# Patient Record
Sex: FEMALE | Race: OTHER | NOT HISPANIC OR LATINO | URBAN - METROPOLITAN AREA
[De-identification: names, ages, dates, MRNs, and addresses within clinical notes are randomized per-mention and may not be internally consistent; named-entity substitution may affect disease eponyms.]

---

## 2022-08-03 ENCOUNTER — EMERGENCY (EMERGENCY)
Facility: HOSPITAL | Age: 22
LOS: 1 days | Discharge: HOME | End: 2022-08-03
Attending: EMERGENCY MEDICINE | Admitting: EMERGENCY MEDICINE
Payer: MEDICAID

## 2022-08-03 VITALS
TEMPERATURE: 99 F | RESPIRATION RATE: 16 BRPM | DIASTOLIC BLOOD PRESSURE: 68 MMHG | SYSTOLIC BLOOD PRESSURE: 104 MMHG | HEART RATE: 78 BPM | OXYGEN SATURATION: 98 %

## 2022-08-03 VITALS
HEART RATE: 67 BPM | OXYGEN SATURATION: 99 % | TEMPERATURE: 98 F | WEIGHT: 130.07 LBS | RESPIRATION RATE: 15 BRPM | HEIGHT: 61 IN | SYSTOLIC BLOOD PRESSURE: 115 MMHG | DIASTOLIC BLOOD PRESSURE: 77 MMHG

## 2022-08-03 DIAGNOSIS — R00.0 TACHYCARDIA, UNSPECIFIED: ICD-10-CM

## 2022-08-03 DIAGNOSIS — R07.81 PLEURODYNIA: ICD-10-CM

## 2022-08-03 DIAGNOSIS — Z20.822 CONTACT WITH AND (SUSPECTED) EXPOSURE TO COVID-19: ICD-10-CM

## 2022-08-03 DIAGNOSIS — R06.02 SHORTNESS OF BREATH: ICD-10-CM

## 2022-08-03 DIAGNOSIS — R07.9 CHEST PAIN, UNSPECIFIED: ICD-10-CM

## 2022-08-03 LAB
ALBUMIN SERPL ELPH-MCNC: 5.2 G/DL — HIGH (ref 3.3–5)
ALP SERPL-CCNC: 48 U/L — SIGNIFICANT CHANGE UP (ref 40–120)
ALT FLD-CCNC: 11 U/L — SIGNIFICANT CHANGE UP (ref 10–45)
ANION GAP SERPL CALC-SCNC: 12 MMOL/L — SIGNIFICANT CHANGE UP (ref 5–17)
AST SERPL-CCNC: 17 U/L — SIGNIFICANT CHANGE UP (ref 10–40)
BASOPHILS # BLD AUTO: 0.04 K/UL — SIGNIFICANT CHANGE UP (ref 0–0.2)
BASOPHILS NFR BLD AUTO: 0.4 % — SIGNIFICANT CHANGE UP (ref 0–2)
BILIRUB SERPL-MCNC: 0.7 MG/DL — SIGNIFICANT CHANGE UP (ref 0.2–1.2)
BUN SERPL-MCNC: 13 MG/DL — SIGNIFICANT CHANGE UP (ref 7–23)
CALCIUM SERPL-MCNC: 9.9 MG/DL — SIGNIFICANT CHANGE UP (ref 8.4–10.5)
CHLORIDE SERPL-SCNC: 102 MMOL/L — SIGNIFICANT CHANGE UP (ref 96–108)
CO2 SERPL-SCNC: 23 MMOL/L — SIGNIFICANT CHANGE UP (ref 22–31)
CREAT SERPL-MCNC: 0.55 MG/DL — SIGNIFICANT CHANGE UP (ref 0.5–1.3)
D DIMER BLD IA.RAPID-MCNC: 229 NG/ML DDU — SIGNIFICANT CHANGE UP
EGFR: 134 ML/MIN/1.73M2 — SIGNIFICANT CHANGE UP
EOSINOPHIL # BLD AUTO: 0.2 K/UL — SIGNIFICANT CHANGE UP (ref 0–0.5)
EOSINOPHIL NFR BLD AUTO: 2 % — SIGNIFICANT CHANGE UP (ref 0–6)
GLUCOSE SERPL-MCNC: 94 MG/DL — SIGNIFICANT CHANGE UP (ref 70–99)
HCG SERPL-ACNC: <0 MIU/ML — SIGNIFICANT CHANGE UP
HCT VFR BLD CALC: 37.2 % — SIGNIFICANT CHANGE UP (ref 34.5–45)
HGB BLD-MCNC: 13 G/DL — SIGNIFICANT CHANGE UP (ref 11.5–15.5)
IMM GRANULOCYTES NFR BLD AUTO: 0.2 % — SIGNIFICANT CHANGE UP (ref 0–1.5)
LYMPHOCYTES # BLD AUTO: 3.06 K/UL — SIGNIFICANT CHANGE UP (ref 1–3.3)
LYMPHOCYTES # BLD AUTO: 31 % — SIGNIFICANT CHANGE UP (ref 13–44)
MCHC RBC-ENTMCNC: 31.3 PG — SIGNIFICANT CHANGE UP (ref 27–34)
MCHC RBC-ENTMCNC: 34.9 GM/DL — SIGNIFICANT CHANGE UP (ref 32–36)
MCV RBC AUTO: 89.6 FL — SIGNIFICANT CHANGE UP (ref 80–100)
MONOCYTES # BLD AUTO: 0.55 K/UL — SIGNIFICANT CHANGE UP (ref 0–0.9)
MONOCYTES NFR BLD AUTO: 5.6 % — SIGNIFICANT CHANGE UP (ref 2–14)
NEUTROPHILS # BLD AUTO: 6.01 K/UL — SIGNIFICANT CHANGE UP (ref 1.8–7.4)
NEUTROPHILS NFR BLD AUTO: 60.8 % — SIGNIFICANT CHANGE UP (ref 43–77)
NRBC # BLD: 0 /100 WBCS — SIGNIFICANT CHANGE UP (ref 0–0)
PLATELET # BLD AUTO: 271 K/UL — SIGNIFICANT CHANGE UP (ref 150–400)
POTASSIUM SERPL-MCNC: 3.8 MMOL/L — SIGNIFICANT CHANGE UP (ref 3.5–5.3)
POTASSIUM SERPL-SCNC: 3.8 MMOL/L — SIGNIFICANT CHANGE UP (ref 3.5–5.3)
PROT SERPL-MCNC: 7.9 G/DL — SIGNIFICANT CHANGE UP (ref 6–8.3)
RBC # BLD: 4.15 M/UL — SIGNIFICANT CHANGE UP (ref 3.8–5.2)
RBC # FLD: 12.1 % — SIGNIFICANT CHANGE UP (ref 10.3–14.5)
SARS-COV-2 RNA SPEC QL NAA+PROBE: NEGATIVE — SIGNIFICANT CHANGE UP
SODIUM SERPL-SCNC: 137 MMOL/L — SIGNIFICANT CHANGE UP (ref 135–145)
WBC # BLD: 9.88 K/UL — SIGNIFICANT CHANGE UP (ref 3.8–10.5)
WBC # FLD AUTO: 9.88 K/UL — SIGNIFICANT CHANGE UP (ref 3.8–10.5)

## 2022-08-03 PROCEDURE — 93010 ELECTROCARDIOGRAM REPORT: CPT

## 2022-08-03 PROCEDURE — 87635 SARS-COV-2 COVID-19 AMP PRB: CPT

## 2022-08-03 PROCEDURE — 84702 CHORIONIC GONADOTROPIN TEST: CPT

## 2022-08-03 PROCEDURE — 99285 EMERGENCY DEPT VISIT HI MDM: CPT | Mod: 25

## 2022-08-03 PROCEDURE — 71046 X-RAY EXAM CHEST 2 VIEWS: CPT

## 2022-08-03 PROCEDURE — 84484 ASSAY OF TROPONIN QUANT: CPT

## 2022-08-03 PROCEDURE — 96374 THER/PROPH/DIAG INJ IV PUSH: CPT

## 2022-08-03 PROCEDURE — 85025 COMPLETE CBC W/AUTO DIFF WBC: CPT

## 2022-08-03 PROCEDURE — 71046 X-RAY EXAM CHEST 2 VIEWS: CPT | Mod: 26

## 2022-08-03 PROCEDURE — 93005 ELECTROCARDIOGRAM TRACING: CPT

## 2022-08-03 PROCEDURE — 85379 FIBRIN DEGRADATION QUANT: CPT

## 2022-08-03 PROCEDURE — 36415 COLL VENOUS BLD VENIPUNCTURE: CPT

## 2022-08-03 PROCEDURE — 80053 COMPREHEN METABOLIC PANEL: CPT

## 2022-08-03 RX ORDER — KETOROLAC TROMETHAMINE 30 MG/ML
15 SYRINGE (ML) INJECTION ONCE
Refills: 0 | Status: DISCONTINUED | OUTPATIENT
Start: 2022-08-03 | End: 2022-08-03

## 2022-08-03 RX ORDER — HYDROXYZINE HCL 10 MG
25 TABLET ORAL ONCE
Refills: 0 | Status: COMPLETED | OUTPATIENT
Start: 2022-08-03 | End: 2022-08-03

## 2022-08-03 RX ORDER — ALBUTEROL 90 UG/1
1 AEROSOL, METERED ORAL
Qty: 1 | Refills: 0
Start: 2022-08-03

## 2022-08-03 RX ORDER — HYDROXYZINE HCL 10 MG
1 TABLET ORAL
Qty: 8 | Refills: 0
Start: 2022-08-03

## 2022-08-03 RX ADMIN — Medication 25 MILLIGRAM(S): at 21:47

## 2022-08-03 RX ADMIN — Medication 15 MILLIGRAM(S): at 21:23

## 2022-08-03 NOTE — ED ADULT NURSE NOTE - OBJECTIVE STATEMENT
patient states her mom  2 months ago and since she has been worried that "something is wrong." pt reports generalized CP and SOB. RR easy, even, un-labored on room air. pt states she was seen in another ER last week and everything was negative. pt tearful on assessment. no significant medical hx.

## 2022-08-03 NOTE — ED PROVIDER NOTE - OBJECTIVE STATEMENT
22 F denies pmh p/w chest pain and sob x 2 weeks.  pt reports pleuritic diffuse chest and upper back pain w/ associated sob.  not exertional.  states sxs cause her to become anxious and start hyperventilating.  was seen in ED last week and reports told labs and cxr were normal (does not have results).  went to [facility] but left prior to eval and came to [facility].  pt reports increased stress after passing of mother few months ago.  recently traveled to/from [place] for .  denies f/c, headache, dizziness, fainting, cough, uri sxs, abd pain, nvd, urinary sxs, leg pain/swelling, recent surgery, use of hormones, h/o VTE, h/o CA, trauma.  pt nonsmoker, no FH CAD

## 2022-08-03 NOTE — ED PROVIDER NOTE - PATIENT PORTAL LINK FT
You can access the FollowMyHealth Patient Portal offered by [facility] by registering at the following website: [email or web address]. By joining [employer]’s FollowMyHealth portal, you will also be able to view your health information using other applications (apps) compatible with our system.

## 2022-08-03 NOTE — ED PROVIDER NOTE - NSFOLLOWUPINSTRUCTIONS_ED_ALL_ED_FT
Please rest and remain well hydrated with plenty of fluids.  You can take motrin 600-800mg and tylenol 650mg every 3 hours, switching between the two for pain    You can take hydroxyzine as prescribed for anxiety    Call to arrange follow up with your primary care doctor and therapist.      Chest Pain    Chest pain can be caused by many different conditions which may or may not be dangerous. Causes include heartburn, lung infections, heart attack, blood clot in lungs, skin infections, strain or damage to muscle, cartilage, or bones, etc. In addition to a history and physical examination, an electrocardiogram (ECG) or other lab tests may have been performed to determine the cause of your chest pain. Follow up with your primary care provider or with a cardiologist as instructed.     SEEK IMMEDIATE MEDICAL CARE IF YOU HAVE ANY OF THE FOLLOWING SYMPTOMS: worsening chest pain, coughing up blood, unexplained back/neck/jaw pain, severe abdominal pain, dizziness or lightheadedness, fainting, shortness of breath, sweaty or clammy skin, vomiting, or racing heart beat. These symptoms may represent a serious problem that is an emergency. Do not wait to see if the symptoms will go away. Get medical help right away. Call 911 and do not drive yourself to the hospital.    Shortness of breath    Shortness of breath (dyspnea) means you have trouble breathing and could indicate a medical problem. Causes include lung disease, heart disease, low amount of red blood cells (anemia), poor physical fitness, being overweight, smoking, etc. Your health care provider today may not be able to find a cause for your shortness of breath after your exam. In this case, it is important to have a follow-up exam with your primary care physician as instructed. If medicines were prescribed, take them as directed for the full length of time directed. Refrain from tobacco products.    SEEK IMMEDIATE MEDICAL CARE IF YOU HAVE ANY OF THE FOLLOWING SYMPTOMS: worsening shortness of breath, chest pain, back pain, abdominal pain, fever, coughing up blood, lightheadedness/dizziness.

## 2022-08-03 NOTE — ED PROVIDER NOTE - PHYSICAL EXAMINATION
Vitals reviewed  Gen: anxious appearing/tearful, nad, speaking in full sentences- no hypoxia/dyspnea   Skin: wwp, no rash/lesions  HEENT: ncat, eomi, mmm  CV: tachycardia, regular rhythm, no audible m/r/g  Chest: reproducible ttp over midsternal region  Resp: symmetrical expansion, ctab, no w/r/r  Abd: nondistended, soft/nt  Ext: FROM throughout, no peripheral edema, no calf ttp, distal pulses intact   Neuro: alert/oriented, no focal deficits, steady gait

## 2022-08-03 NOTE — ED ADULT NURSE NOTE - NURSING NEURO LEVEL OF CONSCIOUSNESS
Detail Level: Simple Plan: Recommended OTC Serica stretch elizabeth cream. Patient declines tazarotene cream at this time Plan: Patient to continue spironolactone 100mg daily x 1 year and sprintec x 1year. She will call if needs more refill of birth control. alert and awake/follows commands

## 2022-08-03 NOTE — ED PROVIDER NOTE - NS ED ATTENDING STATEMENT MOD
This was a shared visit with the MIGUEL. I reviewed and verified the documentation and independently performed the documented:

## 2022-08-03 NOTE — ED PROVIDER NOTE - CLINICAL SUMMARY MEDICAL DECISION MAKING FREE TEXT BOX
22 F denies pmh p/w pleuritic chest pain and sob x 2 weeks. 22 F denies pmh p/w pleuritic chest pain and sob x 2 weeks.  pt w/ increased stress 2/2 recent passing of mother.  recent travel to [place], no long trips.  pt tachy on exam, no hypoxia/dyspnea, lungs ctab, reproducible ttp over anterior chest, no LE edema/calf ttp.  ekg < 1mm jyothi inferolateral.  labs neg ddimer/trop.  neg preg.  cxr no i/e.  improved s/p NSAIDs/hydroxyzine, suspect msk cp and anxiety.  has a therapist.  will also f/u with her pmd.  discussed strict return parameters

## 2022-08-03 NOTE — ED PROVIDER NOTE - MDM ORDERS SUBMITTED SELECTION
Would recommend lowering Abilify to half a 2 mg tablet daily.  Would also recommend starting propranolol 10 mg up to twice daily as needed for restlessness.  This will also help with anxiety.  Please let patient know I am sorry she is struggling but glad symptoms are slightly improved with the new medication   Labs/Imaging Studies/Medications Labs/EKG/Imaging Studies/Medications

## 2022-08-03 NOTE — ED PROVIDER NOTE - ATTENDING APP SHARED VISIT CONTRIBUTION OF CARE
Vitals wnl, exam as above. Labs wnl. D-dimer <cutoff of 230. Very low initial clinical suspicion for PE. COVID neg. CXR wnl. Given toradol/hydroxyzine w/ improvement. ddx: low suspicion pericarditis.   Clinically not ACS, PE, tamponade, dissection, PTX, perf, myocarditis, mediastinitis.  Discussed importance of outpt follow up and return precautions. Clinically no indication for further emergent ED workup or hospitalization at this time. Stable for dc, outpt f/u.

## 2023-09-21 NOTE — ED ADULT TRIAGE NOTE - NSSEPSISSUSPECTED_ED_A_ED
Prior to immunization administration, verified patients identity using patient s name and date of birth. Please see Immunization Activity for additional information.     Screening Questionnaire for Pediatric Immunization    Is the child sick today?   No   Does the child have allergies to medications, food, a vaccine component, or latex?   No   Has the child had a serious reaction to a vaccine in the past?   No   Does the child have a long-term health problem with lung, heart, kidney or metabolic disease (e.g., diabetes), asthma, a blood disorder, no spleen, complement component deficiency, a cochlear implant, or a spinal fluid leak?  Is he/she on long-term aspirin therapy?   No   If the child to be vaccinated is 2 through 4 years of age, has a healthcare provider told you that the child had wheezing or asthma in the  past 12 months?   No   If your child is a baby, have you ever been told he or she has had intussusception?   No   Has the child, sibling or parent had a seizure, has the child had brain or other nervous system problems?   No   Does the child have cancer, leukemia, AIDS, or any immune system         problem?   No   Does the child have a parent, brother, or sister with an immune system problem?   No   In the past 3 months, has the child taken medications that affect the immune system such as prednisone, other steroids, or anticancer drugs; drugs for the treatment of rheumatoid arthritis, Crohn s disease, or psoriasis; or had radiation treatments?   No   In the past year, has the child received a transfusion of blood or blood products, or been given immune (gamma) globulin or an antiviral drug?   No   Is the child/teen pregnant or is there a chance that she could become       pregnant during the next month?   No   Has the child received any vaccinations in the past 4 weeks?   No               Immunization questionnaire answers were all negative.      Patient instructed to remain in clinic for 15 minutes  afterwards, and to report any adverse reactions.     Screening performed by Carlee Oshea MA on 9/21/2023 at 8:26 AM.           No

## 2024-01-01 NOTE — ED ADULT NURSE NOTE - SUICIDE SCREENING QUESTION 2
SUBJECTIVE:   El is a 35 year old, presenting for the following:  Physical, Anxiety, and Depression      Healthy Habits:     Getting at least 3 servings of Calcium per day:  Yes    Bi-annual eye exam:  Yes    Dental care twice a year:  Yes    Sleep apnea or symptoms of sleep apnea:  None    Diet:  Regular (no restrictions)    Frequency of exercise:  2-3 days/week    Duration of exercise:  15-30 minutes    Taking medications regularly:  No    Barriers to taking medications:  Problems remembering to take them    Medication side effects:  None    Additional concerns today:  No      Due for several vaccines. Willing to get.    He quit smoking 2 months ago.     Depression and Anxiety Follow-Up  How are you doing with your depression since your last visit? No change  How are you doing with your anxiety since your last visit?  No change  Are you having other symptoms that might be associated with depression or anxiety? No  Have you had a significant life event? OTHER: additional child added  Wife is pregnant  Do you have any concerns with your use of alcohol or other drugs? No  No thoughts of self harm.   Taking Wellbutrin without side effects. Does forgot to take it at times as he works shift work.     Social History     Tobacco Use    Smoking status: Some Days     Packs/day: 0.10     Years: 10.00     Additional pack years: 0.00     Total pack years: 1.00     Types: Pipe, Cigarettes     Start date: 1/1/2006     Last attempt to quit: 12/30/2020     Years since quitting: 3.0    Smokeless tobacco: Never    Tobacco comments:     Pt denies quit plan 9/18/23   Vaping Use    Vaping Use: Never used   Substance Use Topics    Alcohol use: Yes     Comment: drinks once a week    Drug use: No         12/7/2022     9:06 AM 6/14/2023     9:11 AM 1/3/2024     8:34 AM   PHQ   PHQ-9 Total Score 0 1 2   Q9: Thoughts of better off dead/self-harm past 2 weeks Not at all Not at all Not at all         12/7/2022     9:00 AM 6/14/2023     9:12  "AM 1/3/2024     8:35 AM   JONAH-7 SCORE   Total Score  1 (minimal anxiety) 1 (minimal anxiety)   Total Score 1 1 1         1/3/2024     8:34 AM   Last PHQ-9   1.  Little interest or pleasure in doing things 1   2.  Feeling down, depressed, or hopeless 0   3.  Trouble falling or staying asleep, or sleeping too much 1   4.  Feeling tired or having little energy 0   5.  Poor appetite or overeating 0   6.  Feeling bad about yourself 0   7.  Trouble concentrating 0   8.  Moving slowly or restless 0   Q9: Thoughts of better off dead/self-harm past 2 weeks 0   PHQ-9 Total Score 2         1/3/2024     8:35 AM   JONAH-7    1. Feeling nervous, anxious, or on edge 0   2. Not being able to stop or control worrying 0   3. Worrying too much about different things 0   4. Trouble relaxing 0   5. Being so restless that it is hard to sit still 0   6. Becoming easily annoyed or irritable 1   7. Feeling afraid, as if something awful might happen 0   JONAH-7 Total Score 1   If you checked any problems, how difficult have they made it for you to do your work, take care of things at home, or get along with other people? Not difficult at all           Social History     Tobacco Use    Smoking status: Some Days     Packs/day: 0.10     Years: 10.00     Additional pack years: 0.00     Total pack years: 1.00     Types: Pipe, Cigarettes     Start date: 1/1/2006     Last attempt to quit: 12/30/2020     Years since quitting: 3.0    Smokeless tobacco: Never    Tobacco comments:     Pt denies quit plan 9/18/23   Substance Use Topics    Alcohol use: Yes     Comment: drinks once a week             1/3/2024     8:37 AM   Alcohol Use   Prescreen: >3 drinks/day or >7 drinks/week? No       Last PSA: No results found for: \"PSA\"    Reviewed orders with patient. Reviewed health maintenance and updated orders accordingly - Yes  BP Readings from Last 3 Encounters:   01/03/24 116/84   09/18/23 125/82   09/15/23 132/68    Wt Readings from Last 3 Encounters: "   01/03/24 111.1 kg (244 lb 14.4 oz)   09/15/23 101.9 kg (224 lb 9.6 oz)   09/15/23 103.1 kg (227 lb 6 oz)                  Patient Active Problem List   Diagnosis    Gastric reflux    Current moderate episode of major depressive disorder without prior episode (H)    Anxiety    Tobacco abuse     Past Surgical History:   Procedure Laterality Date    growth removal[  2001    left leg    HERNIA REPAIR  07/2019    [facility]       Social History     Tobacco Use    Smoking status: Some Days     Packs/day: 0.10     Years: 10.00     Additional pack years: 0.00     Total pack years: 1.00     Types: Pipe, Cigarettes     Start date: 1/1/2006     Last attempt to quit: 12/30/2020     Years since quitting: 3.0    Smokeless tobacco: Never    Tobacco comments:     Pt denies quit plan 9/18/23   Substance Use Topics    Alcohol use: Yes     Comment: drinks once a week     Family History   Problem Relation Age of Onset    Colon Cancer No family hx of          Current Outpatient Medications   Medication Sig Dispense Refill    buPROPion (WELLBUTRIN XL) 150 MG 24 hr tablet Take 1 tablet (150 mg) by mouth every morning 90 tablet 1    mupirocin (BACTROBAN) 2 % external ointment Apply topically 3 times daily Apply to affected area 3 times daily. (Patient not taking: Reported on 1/3/2024) 30 g 0        No past medical history on file.   Past Surgical History:   Procedure Laterality Date    growth removal[  2001    left leg    HERNIA REPAIR  07/2019    [facility]       Review of Systems   Constitutional:  Negative for chills and fever.   HENT:  Negative for congestion, ear pain, hearing loss and sore throat.    Eyes:  Negative for pain and visual disturbance.   Respiratory:  Negative for cough and shortness of breath.    Cardiovascular:  Negative for chest pain, palpitations and peripheral edema.   Gastrointestinal:  Negative for abdominal pain, constipation, diarrhea, heartburn, hematochezia and nausea.   Genitourinary:  Negative for  "dysuria, frequency, genital sores, hematuria, impotence, penile discharge and urgency.   Musculoskeletal:  Negative for arthralgias, joint swelling and myalgias.   Skin:  Negative for rash.   Neurological:  Negative for dizziness, weakness, headaches and paresthesias.   Psychiatric/Behavioral:  Negative for mood changes. The patient is not nervous/anxious.          OBJECTIVE:   /84   Pulse 79   Temp 97.4  F (36.3  C) (Tympanic)   Resp 16   Ht 1.892 m (6' 2.5\")   Wt 111.1 kg (244 lb 14.4 oz)   SpO2 96%   BMI 31.02 kg/m      Physical Exam  GENERAL: healthy, alert and no distress, overweight  EYES: Eyes grossly normal to inspection, PERRL and conjunctivae and sclerae normal  HENT: ear canals and TM's normal, nose and mouth without ulcers or lesions  NECK: no adenopathy, no asymmetry, masses, or scars and thyroid normal to palpation  RESP: lungs clear to auscultation - no rales, rhonchi or wheezes  CV: regular rate and rhythm, normal S1 S2, no S3 or S4, no murmur, click or rub, no peripheral edema and peripheral pulses strong  ABDOMEN: soft, nontender, no hepatosplenomegaly, no masses and bowel sounds normal   (male): declined exam  MS: no gross musculoskeletal defects noted, no edema  SKIN: no suspicious lesions or rashes  NEURO: Normal strength and tone, mentation intact and speech normal  PSYCH: mentation appears normal, affect normal/bright    Labs in process    ASSESSMENT/PLAN:   (Z00.00) Health maintenance examination  (primary encounter diagnosis)  Plan: Will update vaccines today. Labs also updated. PSA at 50. Colonoscopy at 46.     (F32.1) Current moderate episode of major depressive disorder without prior episode (H)  (F41.9) Anxiety  Comment: mental health stable  Plan: Will continue the Wellbutrin.     ((E66.9) Obesity, Class I, BMI 30-34.9  Comment: BMI 31  Plan: Vitamin D Deficiency        Diet and exercise encouraged. Will also run a Vit D. Will notify patient of the results when available " and intervene accordingly.     (Z13.1) Screening for diabetes mellitus  Plan: Basic metabolic panel        Will notify patient of the results when available and intervene accordingly.     (Z13.220) Lipid screening  Plan: Lipid Profile (Chol, Trig, HDL, LDL calc)        Will notify patient of the results when available and intervene accordingly.     Patient has been advised of split billing requirements and indicates understanding: Yes      COUNSELING:   Reviewed preventive health counseling, as reflected in patient instructions       Regular exercise       Healthy diet/nutrition       Consider Hep C screening for all patients one time for ages 18-79 years        He reports that he has been smoking pipe and cigarettes. He started smoking about 18 years ago. He has a 1 pack-year smoking history. He has never used smokeless tobacco.  Nicotine/Tobacco Cessation Plan:   Information offered: Patient not interested at this time          Dodie Hebert NP  [facility]   No